# Patient Record
Sex: FEMALE | Race: WHITE | NOT HISPANIC OR LATINO | Employment: OTHER | URBAN - METROPOLITAN AREA
[De-identification: names, ages, dates, MRNs, and addresses within clinical notes are randomized per-mention and may not be internally consistent; named-entity substitution may affect disease eponyms.]

---

## 2018-09-04 ENCOUNTER — OFFICE VISIT (OUTPATIENT)
Dept: ORTHOPEDIC SURGERY | Facility: CLINIC | Age: 68
End: 2018-09-04

## 2018-09-04 ENCOUNTER — TELEPHONE (OUTPATIENT)
Dept: ORTHOPEDIC SURGERY | Facility: CLINIC | Age: 68
End: 2018-09-04

## 2018-09-04 VITALS — TEMPERATURE: 98.2 F | BODY MASS INDEX: 31.92 KG/M2 | HEIGHT: 64 IN | WEIGHT: 187 LBS

## 2018-09-04 DIAGNOSIS — M17.11 PRIMARY OSTEOARTHRITIS OF RIGHT KNEE: Primary | ICD-10-CM

## 2018-09-04 PROCEDURE — 99203 OFFICE O/P NEW LOW 30 MIN: CPT | Performed by: ORTHOPAEDIC SURGERY

## 2018-09-04 RX ORDER — LORATADINE 10 MG/1
10 TABLET ORAL DAILY
COMMUNITY

## 2018-09-04 RX ORDER — DICLOFENAC POTASSIUM 50 MG/1
50 TABLET, FILM COATED ORAL DAILY
COMMUNITY

## 2018-09-04 RX ORDER — ANASTROZOLE 1 MG/1
TABLET ORAL
COMMUNITY
Start: 2018-05-04 | End: 2018-09-04 | Stop reason: SDUPTHER

## 2018-09-04 RX ORDER — OMEPRAZOLE 20 MG/1
20 CAPSULE, DELAYED RELEASE ORAL DAILY
COMMUNITY
Start: 2018-05-30

## 2018-09-04 RX ORDER — ETODOLAC 400 MG/1
TABLET, FILM COATED ORAL
COMMUNITY
End: 2019-08-22

## 2018-09-04 RX ORDER — METOPROLOL SUCCINATE 100 MG/1
150 TABLET, EXTENDED RELEASE ORAL DAILY
COMMUNITY
Start: 2018-06-07

## 2018-09-04 RX ORDER — ATORVASTATIN CALCIUM 40 MG/1
40 TABLET, FILM COATED ORAL NIGHTLY
COMMUNITY
Start: 2018-06-07 | End: 2023-03-28

## 2018-09-04 RX ORDER — MONTELUKAST SODIUM 10 MG/1
10 TABLET ORAL NIGHTLY
COMMUNITY

## 2018-09-04 RX ORDER — ANASTROZOLE 1 MG/1
1 TABLET ORAL DAILY
COMMUNITY

## 2018-09-04 NOTE — PROGRESS NOTES
Patient: Tammy Blackburn  YOB: 1950 68 y.o. female  Medical Record Number: [id number]    Chief Complaints:   Chief Complaint   Patient presents with   • Right Knee - Pain, Establish Care       History of Present Illness:Tammy Blackburn is a 68 y.o. female who presents with 3 mo c/o right leg pain. Denies any h/o trauma. Describes the pain as moderate ache, worse with any activity. She has had pain now for about 3 months.  At times it severe is currently moderate.  She takes diclofenac and that seems to help allergies also using a brace and has had injections performed by Dr. Newton Muniz.  The injection was short lived.    Allergies: No Known Allergies    Medications:   Current Outpatient Prescriptions   Medication Sig Dispense Refill   • anastrozole (ARIMIDEX) 1 MG tablet anastrozole 1 mg tablet   Take 1 tablet every day by oral route.     • ANASTROZOLE PO Take 1 mg by mouth.     • aspirin 81 MG tablet Take 81 mg by mouth.     • atorvastatin (LIPITOR) 40 MG tablet Take 40 mg by mouth.     • diclofenac (CATAFLAM) 50 MG tablet diclofenac potassium 50 mg tablet   TK 1 T PO UP TO BID PRF PAIN     • etodolac (LODINE) 400 MG tablet etodolac 400 mg tablet   Take 1 tablet every day by oral route.     • loratadine (CLARITIN) 10 MG tablet Claritin 10 mg tablet   Take 1 tablet every day by oral route.     • metoprolol succinate XL (TOPROL-XL) 100 MG 24 hr tablet Take 150 mg by mouth.     • montelukast (SINGULAIR) 10 MG tablet montelukast 10 mg tablet   Take 1 tablet every day by oral route.     • Multiple Vitamin (MULTI-VITAMIN DAILY PO) Multi Vitamin   Take one tablet daily     • omeprazole (priLOSEC) 20 MG capsule Take 20 mg by mouth.       No current facility-administered medications for this visit.          The following portions of the patient's history were reviewed and updated as appropriate: allergies, current medications, past family history, past medical history, past social history, past  "surgical history and problem list.    Review of Systems:   A 14 point review of systems was performed. All systems negative except pertinent positives/negative listed in HPI above    Physical Exam:   Vitals:    09/04/18 1142   Temp: 98.2 °F (36.8 °C)   TempSrc: Temporal Artery    Weight: 130 kg (287 lb)   Height: 162.6 cm (64\")       General: A and O x 3, ASA, NAD    SCLERA:    Normal    DENTITION:   Normal   Knee:  right    ALIGNMENT:     Varus  ,   Patella  tracks  midline    GAIT:    Antalgic    SKIN:    No abnormality    RANGE OF MOTION:   0  -  125   DEG    STRENGTH:   4  / 5    LIGAMENTS:    No varus / valgus instability.   Negative  Lachman.    MENISCUS:     Negative   Claudia       DISTAL PULSES:    Paplable    DISTAL SENSATION :   Intact    LYMPHATICS:     No   lymphadenopathy    OTHER:          - no  effusion      - Crepitance with ROM         Radiology: patient requested not to have x-rays today.  We did review an MRI and report dated 7/25/18 which shows a radial tear of the posterior horn of the medial meniscus as well as medial compartment arthrosis and a 10 x 6 subcortical insufficiency stress fracture.    Assessment/Plan: right knee osteoarthritis with medial compartment insufficiency stress fracture likely related to the osteoarthritis.  Her symptoms are better controlled now with anti-inflammatories which I think she should continue.  We also set a goal for 10 pounds weight loss.  I'm going to have her see a physical therapist to work on some quad strengthening exercises.  She can use her brace as needed between now and then.  I'd like to check her back in 2 months and would like to perform x-rays of her knee at that point to monitor her healing in her degree of arthrosis on x-ray      Lavell Roberson MD  9/4/2018  "

## 2018-11-27 ENCOUNTER — OFFICE VISIT (OUTPATIENT)
Dept: ORTHOPEDIC SURGERY | Facility: CLINIC | Age: 68
End: 2018-11-27

## 2018-11-27 VITALS — HEIGHT: 64 IN | WEIGHT: 192 LBS | BODY MASS INDEX: 32.78 KG/M2

## 2018-11-27 DIAGNOSIS — G89.29 CHRONIC PAIN OF RIGHT KNEE: Primary | ICD-10-CM

## 2018-11-27 DIAGNOSIS — M25.561 CHRONIC PAIN OF RIGHT KNEE: Primary | ICD-10-CM

## 2018-11-27 PROCEDURE — 73562 X-RAY EXAM OF KNEE 3: CPT | Performed by: NURSE PRACTITIONER

## 2018-11-27 PROCEDURE — 99212 OFFICE O/P EST SF 10 MIN: CPT | Performed by: NURSE PRACTITIONER

## 2018-11-27 RX ORDER — GABAPENTIN 600 MG/1
600 TABLET ORAL 3 TIMES DAILY
COMMUNITY
End: 2023-03-28 | Stop reason: ALTCHOICE

## 2018-11-27 NOTE — PROGRESS NOTES
Patient: Margarette Blackburn  YOB: 1950 68 y.o. female  Medical Record Number: [id number]    Chief Complaints:   Chief Complaint   Patient presents with   • Right Knee - Pain, Follow-up       History of Present Illness:Margarette Blackburn is a 68 y.o. female who presents for follow-up of  right knee.  Patient saw Dr. Mao a couple months ago.  She does have significant osteoarthritis.  She is been taking anti-inflammatories and has continued doing physical therapy exercises which has helped considerably.  At this point she is only having some minimal pain at times.    Allergies: No Known Allergies    Medications:   Current Outpatient Medications   Medication Sig Dispense Refill   • anastrozole (ARIMIDEX) 1 MG tablet anastrozole 1 mg tablet   Take 1 tablet every day by oral route.     • ANASTROZOLE PO Take 1 mg by mouth.     • aspirin 81 MG tablet Take 81 mg by mouth.     • atorvastatin (LIPITOR) 40 MG tablet Take 40 mg by mouth.     • diclofenac (CATAFLAM) 50 MG tablet diclofenac potassium 50 mg tablet   TK 1 T PO UP TO BID PRF PAIN     • etodolac (LODINE) 400 MG tablet etodolac 400 mg tablet   Take 1 tablet every day by oral route.     • gabapentin (NEURONTIN) 600 MG tablet Take 400 mg by mouth 3 (Three) Times a Day.     • loratadine (CLARITIN) 10 MG tablet Claritin 10 mg tablet   Take 1 tablet every day by oral route.     • metoprolol succinate XL (TOPROL-XL) 100 MG 24 hr tablet Take 150 mg by mouth.     • montelukast (SINGULAIR) 10 MG tablet montelukast 10 mg tablet   Take 1 tablet every day by oral route.     • Multiple Vitamin (MULTI-VITAMIN DAILY PO) Multi Vitamin   Take one tablet daily     • omeprazole (priLOSEC) 20 MG capsule Take 20 mg by mouth.       No current facility-administered medications for this visit.          The following portions of the patient's history were reviewed and updated as appropriate: allergies, current medications, past family history, past medical history, past social  "history, past surgical history and problem list.    Review of Systems:   A 14 point review of systems was performed. All systems negative except pertinent positives/negative listed in HPI above    Physical Exam:   Vitals:    11/27/18 1109   Weight: 87.1 kg (192 lb)   Height: 162.6 cm (64.02\")       General: A and O x 3, ASA, NAD    SCLERA:    Normal    DENTITION:   Normal  Skin clear no unusual lesions noted  Right knee patient has no appreciable fusion 120° flexion neutral and extension with some mild patellofemoral crepitus noted.  Negative Lockman negative Claudia calf is soft nontender    Radiology:  Xrays 3views (ap,lateral, sunrise) right knee were ordered and reviewed today secondary to pain and show significant bone-on-bone end-stage osteoarthritis of bilateral knees.  Comparative views are unchanged     Assessment/Plan:  Osteoarthritis right knee    I did offer patient injections.  She declines at this time.  She will continue with exercise program, continued weight loss, and she will return the office when necessary  "

## 2019-08-21 PROBLEM — E83.42 HYPOMAGNESEMIA: Status: ACTIVE | Noted: 2018-12-14

## 2019-08-21 PROBLEM — I10 BENIGN ESSENTIAL HYPERTENSION: Status: ACTIVE | Noted: 2019-08-21

## 2019-08-21 PROBLEM — Z92.89 H/O ECHOCARDIOGRAM: Status: ACTIVE | Noted: 2019-08-21

## 2019-08-21 PROBLEM — I31.39 PERICARDIAL EFFUSION: Status: ACTIVE | Noted: 2018-12-14

## 2019-08-22 ENCOUNTER — OFFICE VISIT (OUTPATIENT)
Dept: CARDIOLOGY | Facility: CLINIC | Age: 69
End: 2019-08-22

## 2019-08-22 VITALS
HEART RATE: 71 BPM | DIASTOLIC BLOOD PRESSURE: 80 MMHG | OXYGEN SATURATION: 95 % | BODY MASS INDEX: 32.96 KG/M2 | HEIGHT: 63 IN | WEIGHT: 186 LBS | SYSTOLIC BLOOD PRESSURE: 124 MMHG

## 2019-08-22 DIAGNOSIS — E78.5 HYPERLIPIDEMIA LDL GOAL <130: ICD-10-CM

## 2019-08-22 DIAGNOSIS — I10 BENIGN ESSENTIAL HYPERTENSION: Primary | ICD-10-CM

## 2019-08-22 DIAGNOSIS — C50.919: ICD-10-CM

## 2019-08-22 DIAGNOSIS — I31.39 PERICARDIAL EFFUSION: ICD-10-CM

## 2019-08-22 PROCEDURE — 99213 OFFICE O/P EST LOW 20 MIN: CPT | Performed by: INTERNAL MEDICINE
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
## 2020-02-27 ENCOUNTER — OFFICE VISIT (OUTPATIENT)
Dept: CARDIOLOGY | Facility: CLINIC | Age: 70
End: 2020-02-27

## 2020-02-27 VITALS
HEART RATE: 84 BPM | BODY MASS INDEX: 33.13 KG/M2 | OXYGEN SATURATION: 97 % | DIASTOLIC BLOOD PRESSURE: 78 MMHG | HEIGHT: 63 IN | SYSTOLIC BLOOD PRESSURE: 148 MMHG | WEIGHT: 187 LBS

## 2020-02-27 DIAGNOSIS — E78.5 HYPERLIPIDEMIA LDL GOAL <130: ICD-10-CM

## 2020-02-27 DIAGNOSIS — I10 BENIGN ESSENTIAL HYPERTENSION: Primary | ICD-10-CM

## 2020-02-27 DIAGNOSIS — C50.919: ICD-10-CM

## 2020-02-27 PROCEDURE — 99214 OFFICE O/P EST MOD 30 MIN: CPT | Performed by: INTERNAL MEDICINE

## 2020-02-27 RX ORDER — ICOSAPENT ETHYL 1000 MG/1
2 CAPSULE ORAL 2 TIMES DAILY WITH MEALS
Qty: 120 CAPSULE | Refills: 5 | Status: SHIPPED | OUTPATIENT
Start: 2020-02-27 | End: 2020-09-03 | Stop reason: ALTCHOICE

## 2020-02-27 RX ORDER — DOXYCYCLINE HYCLATE 100 MG
TABLET ORAL
COMMUNITY
Start: 2020-02-25 | End: 2020-09-03 | Stop reason: ALTCHOICE

## 2020-02-27 RX ORDER — LISINOPRIL 20 MG/1
20 TABLET ORAL DAILY
COMMUNITY
Start: 2020-02-13 | End: 2020-03-16

## 2020-02-28 NOTE — PROGRESS NOTES
[facility]        Subjective:     Encounter Date:02/27/2020      Patient ID: Margarette Blackburn is a 69 y.o. female.      HPI: I saw Margarette blackburn today for cardiovascular care.  She is a very pleasant 69-year-old female with a history of breast carcinoma status post right mastectomy.  She has a history of hypertension and dyslipidemia.  She presents today reporting recent upper respiratory infection with mild dyspnea on exertion.  She remains free of chest pain pressure or tightness.  She does not report orthopnea or PND no syncope near syncope or palpitations.  She continues to tolerate her current medical regimen well which includes atorvastatin 40 mg daily.  Her lipids are monitored by Dr. Layla Sr, her primary care physician.      The following portions of the patient's history were reviewed and updated as appropriate: allergies, current medications, past family history, past medical history, past social history, past surgical history and problem list.    Problem List:  Patient Active Problem List   Diagnosis   • Inflammatory carcinoma of breast, stage 3 (CMS/HCC)   • Hyperlipidemia LDL goal <130   • Hypomagnesemia   • GERD (gastroesophageal reflux disease)   • Pericardial effusion   • Benign essential hypertension   • H/O echocardiogram       Past Medical History:  Past Medical History:   Diagnosis Date   • Benign essential hypertension 8/21/2019   • GERD (gastroesophageal reflux disease) 10/30/2014   • H/O echocardiogram 8/21/2019    3/7/2018 - Normal LV size and wall thickness.  EF 60%.  Trace MR and TR.   • Hyperlipidemia LDL goal <130 10/18/2012   • Hypomagnesemia 12/14/2018   • Inflammatory carcinoma of breast, stage 3 (CMS/HCC) 12/1/2016   • Pericardial effusion 12/14/2018       Past Surgical History:  Past Surgical History:   Procedure Laterality Date   • GALLBLADDER SURGERY     • HYSTERECTOMY     • KNEE SURGERY         Social History:  Social History     Socioeconomic History   •  "Marital status:      Spouse name: Not on file   • Number of children: Not on file   • Years of education: Not on file   • Highest education level: Not on file   Tobacco Use   • Smoking status: Never Smoker   Substance and Sexual Activity   • Alcohol use: No   • Drug use: No   • Sexual activity: Defer       Allergies:  No Known Allergies      ROS:  Review of Systems   Constitution: Negative for malaise/fatigue.   HENT: Negative for hearing loss and nosebleeds.    Eyes: Negative for double vision and visual disturbance.   Cardiovascular: Positive for dyspnea on exertion. Negative for chest pain, claudication, near-syncope, orthopnea, palpitations, paroxysmal nocturnal dyspnea and syncope.   Respiratory: Negative for cough, shortness of breath, sleep disturbances due to breathing, snoring, sputum production and wheezing.    Endocrine: Negative for cold intolerance, heat intolerance, polydipsia and polyuria.   Hematologic/Lymphatic: Negative for adenopathy and bleeding problem. Does not bruise/bleed easily.   Skin: Negative for flushing and itching.   Musculoskeletal: Negative for back pain, falls, joint pain, joint swelling, muscle weakness and neck pain.   Gastrointestinal: Negative for abdominal pain, dysphagia, heartburn, nausea and vomiting.   Genitourinary: Negative for dysuria and frequency.   Neurological: Negative for disturbances in coordination, dizziness, light-headedness, loss of balance, numbness and weakness.   Psychiatric/Behavioral: Negative for altered mental status and memory loss. The patient is not nervous/anxious.    Allergic/Immunologic: Negative for hives and persistent infections.          Objective:         /78 (BP Location: Left arm, Patient Position: Sitting, Cuff Size: Large Adult)   Pulse 84   Ht 160 cm (63\")   Wt 84.8 kg (187 lb)   SpO2 97%   BMI 33.13 kg/m²     Physical Exam   Constitutional: She is oriented to person, place, and time. She appears well-developed and " well-nourished.   HENT:   Head: Normocephalic and atraumatic.   Eyes: Pupils are equal, round, and reactive to light. Conjunctivae and EOM are normal.   Neck: Neck supple. No thyromegaly present.   Cardiovascular: Normal rate, regular rhythm, S1 normal, S2 normal and intact distal pulses. PMI is not displaced. Exam reveals gallop and S4. Exam reveals no S3, no distant heart sounds, no friction rub, no midsystolic click and no opening snap.   No murmur heard.  Pulses:       Carotid pulses are 2+ on the right side, and 2+ on the left side.       Radial pulses are 2+ on the right side, and 2+ on the left side.        Femoral pulses are 2+ on the right side, and 2+ on the left side.       Dorsalis pedis pulses are 2+ on the right side, and 2+ on the left side.        Posterior tibial pulses are 2+ on the right side, and 2+ on the left side.   Pulmonary/Chest: Effort normal and breath sounds normal. No respiratory distress. She has no wheezes. She has no rales.   Abdominal: Soft. Bowel sounds are normal. She exhibits no distension and no mass. There is no tenderness.   Musculoskeletal: Normal range of motion. She exhibits no edema.   Neurological: She is alert and oriented to person, place, and time.   Skin: Skin is warm and dry. No erythema.   Psychiatric: She has a normal mood and affect.       In-Office Procedure(s):  Procedures    ASCVD RIsk Score::  The 10-year ASCVD risk score ([place] ANGELO Jr., et al., 2013) is: 15.2%    Values used to calculate the score:      Age: 69 years      Sex: Female      Is Non- : No      Diabetic: No      Tobacco smoker: No      Systolic Blood Pressure: 148 mmHg      Is BP treated: Yes      HDL Cholesterol: 35 mg/dL      Total Cholesterol: 202 mg/dL        Assessment/Plan:         1. Benign essential hypertension  Target less than 130/80    2. Hyperlipidemia LDL goal <100  Triglycerides less than 150    3. Inflammatory carcinoma of breast, stage 3, unspecified  laterality (CMS/HCC)  Stable    Ms. Blackburn is free of angina appears euvolemic.  I recommend target blood pressure less than 130/80.  I have encouraged her to restrict the salt in her diet to as close to 2000 mg a day as possible.  Her last lipid profile from 12/23/2019 revealed triglycerides 345, LDL 98, HDL 35.  I encouraged her to observe a low-cholesterol low saturated fat diet and I recommended she begin Vascepa 2 g twice daily.  I have encouraged her to increase her activity level and I will see her in follow-up in 6 months.       Jeet Spear MD  02/27/20  .

## 2020-03-16 RX ORDER — LISINOPRIL 20 MG/1
TABLET ORAL
Qty: 90 TABLET | Refills: 1 | Status: SHIPPED | OUTPATIENT
Start: 2020-03-16 | End: 2020-09-03 | Stop reason: ALTCHOICE

## 2020-09-03 ENCOUNTER — OFFICE VISIT (OUTPATIENT)
Dept: CARDIOLOGY | Facility: CLINIC | Age: 70
End: 2020-09-03

## 2020-09-03 VITALS
WEIGHT: 185 LBS | BODY MASS INDEX: 32.78 KG/M2 | DIASTOLIC BLOOD PRESSURE: 80 MMHG | SYSTOLIC BLOOD PRESSURE: 144 MMHG | HEIGHT: 63 IN | HEART RATE: 76 BPM

## 2020-09-03 DIAGNOSIS — E78.5 HYPERLIPIDEMIA LDL GOAL <130: ICD-10-CM

## 2020-09-03 DIAGNOSIS — R53.82 CHRONIC FATIGUE: ICD-10-CM

## 2020-09-03 DIAGNOSIS — I10 BENIGN ESSENTIAL HYPERTENSION: Primary | ICD-10-CM

## 2020-09-03 DIAGNOSIS — C50.911: ICD-10-CM

## 2020-09-03 PROCEDURE — 99214 OFFICE O/P EST MOD 30 MIN: CPT | Performed by: INTERNAL MEDICINE

## 2020-09-03 RX ORDER — CHLORAL HYDRATE 500 MG
1 CAPSULE ORAL DAILY
COMMUNITY

## 2020-09-03 RX ORDER — LISINOPRIL 40 MG/1
40 TABLET ORAL DAILY
COMMUNITY
Start: 2020-06-22

## 2020-09-03 RX ORDER — AMLODIPINE BESYLATE 2.5 MG/1
2.5 TABLET ORAL DAILY
Qty: 30 TABLET | Refills: 5 | Status: SHIPPED | OUTPATIENT
Start: 2020-09-03 | End: 2020-12-02

## 2020-09-03 NOTE — PROGRESS NOTES
[facility]        Subjective:     Encounter Date:09/03/2020      Patient ID: Margarette Blackburn is a 70 y.o. female.      HPI: I saw Margarette Blackburn today for cardiovascular care.  She is a pleasant 70-year-old female who observes the CDC guidelines for social distancing.  She remains free of fever or cough.  She does report her baseline dyspnea on exertion but this is stable.  She is free of change in her sense of smell or taste.  Margarette denies chest pain pressure or tightness.  She is free of orthopnea or PND and no lower extremity edema.  She denies syncope near syncope or palpitation she does report generalized fatigue.  She feels it may be related to her medications.  She has a history of breast carcinoma status post right mastectomy.  She has known hypertension her blood pressure is elevated at 144/80.  She has known dyslipidemia and is on atorvastatin 40 mg daily and fish oil 1200 mg daily.      The following portions of the patient's history were reviewed and updated as appropriate: allergies, current medications, past family history, past medical history, past social history, past surgical history and problem list.    Problem List:  Patient Active Problem List   Diagnosis   • Inflammatory carcinoma of breast, stage 3 (CMS/HCC)   • Hyperlipidemia LDL goal <130   • Hypomagnesemia   • GERD (gastroesophageal reflux disease)   • Pericardial effusion   • Benign essential hypertension   • H/O echocardiogram   • Chronic fatigue       Past Medical History:  Past Medical History:   Diagnosis Date   • Benign essential hypertension 8/21/2019   • GERD (gastroesophageal reflux disease) 10/30/2014   • H/O echocardiogram 8/21/2019    3/7/2018 - Normal LV size and wall thickness.  EF 60%.  Trace MR and TR.   • Hyperlipidemia LDL goal <130 10/18/2012   • Hypomagnesemia 12/14/2018   • Inflammatory carcinoma of breast, stage 3 (CMS/HCC) 12/1/2016   • Pericardial effusion 12/14/2018       Past Surgical  "History:  Past Surgical History:   Procedure Laterality Date   • GALLBLADDER SURGERY     • HYSTERECTOMY     • KNEE SURGERY         Social History:  Social History     Socioeconomic History   • Marital status:      Spouse name: Not on file   • Number of children: Not on file   • Years of education: Not on file   • Highest education level: Not on file   Tobacco Use   • Smoking status: Never Smoker   Substance and Sexual Activity   • Alcohol use: No   • Drug use: No   • Sexual activity: Defer       Allergies:  No Known Allergies      ROS:  Review of Systems   Constitution: Positive for malaise/fatigue.   HENT: Negative for hearing loss and nosebleeds.    Eyes: Negative for double vision and visual disturbance.   Cardiovascular: Positive for dyspnea on exertion. Negative for chest pain, claudication, near-syncope, orthopnea, palpitations, paroxysmal nocturnal dyspnea and syncope.   Respiratory: Negative for cough, shortness of breath, sleep disturbances due to breathing, snoring, sputum production and wheezing.    Endocrine: Negative for cold intolerance, heat intolerance, polydipsia and polyuria.   Hematologic/Lymphatic: Negative for adenopathy and bleeding problem. Does not bruise/bleed easily.   Skin: Negative for flushing and itching.   Musculoskeletal: Negative for back pain, falls, joint pain, joint swelling, muscle weakness and neck pain.   Gastrointestinal: Negative for abdominal pain, dysphagia, heartburn, nausea and vomiting.   Genitourinary: Negative for dysuria and frequency.   Neurological: Negative for disturbances in coordination, dizziness, light-headedness, loss of balance, numbness and weakness.   Psychiatric/Behavioral: Negative for altered mental status and memory loss. The patient is not nervous/anxious.    Allergic/Immunologic: Negative for hives and persistent infections.          Objective:         /80   Pulse 76   Ht 160 cm (63\")   Wt 83.9 kg (185 lb)   BMI 32.77 kg/m² "     Physical Exam   Constitutional: She is oriented to person, place, and time. She appears well-developed and well-nourished.   HENT:   Head: Normocephalic and atraumatic.   Eyes: Pupils are equal, round, and reactive to light. Conjunctivae and EOM are normal.   Neck: Neck supple. No thyromegaly present.   Cardiovascular: Normal rate, regular rhythm, S1 normal, S2 normal and intact distal pulses. PMI is not displaced. Exam reveals gallop and S4. Exam reveals no S3, no distant heart sounds, no friction rub, no midsystolic click and no opening snap.   No murmur heard.  Pulses:       Carotid pulses are 2+ on the right side, and 2+ on the left side.       Radial pulses are 2+ on the right side, and 2+ on the left side.        Femoral pulses are 2+ on the right side, and 2+ on the left side.       Dorsalis pedis pulses are 2+ on the right side, and 2+ on the left side.        Posterior tibial pulses are 2+ on the right side, and 2+ on the left side.   Pulmonary/Chest: Effort normal and breath sounds normal. No respiratory distress. She has no wheezes. She has no rales.   Abdominal: Soft. Bowel sounds are normal. She exhibits no distension and no mass. There is no tenderness.   Musculoskeletal: Normal range of motion. She exhibits no edema.   Neurological: She is alert and oriented to person, place, and time.   Skin: Skin is warm and dry. No erythema.   Psychiatric: She has a normal mood and affect.       In-Office Procedure(s):  Procedures    ASCVD RIsk Score::  The 10-year ASCVD risk score (Johnathon PETEY Jr., et al., 2013) is: 15.5%    Values used to calculate the score:      Age: 70 years      Sex: Female      Is Non- : No      Diabetic: No      Tobacco smoker: No      Systolic Blood Pressure: 144 mmHg      Is BP treated: Yes      HDL Cholesterol: 40 mg/dL      Total Cholesterol: 212 mg/dL        Assessment/Plan:         1. Benign essential hypertension  Target less than 130/80    2. Hyperlipidemia  LDL goal <130  Continue low-cholesterol low saturated fat diet and atorvastatin 40 mg daily fish oil 1200 mg daily    3. Inflammatory carcinoma of right breast, stage 3 (CMS/HCC)      4. Chronic fatigue  Suspect multifactorial including age weight deconditioning but also possibly beta-blocker therapy    5.  Obesity  Encourage weight reduction    Ms. Blackburn is free of angina and euvolemic.  I have counseled her on the importance of salt restriction is close to 2000 mg a day as possible.  Of encouraged her to advance her activity level specifically aerobic activity while observing the CDC guidelines for social distancing.  Of encouraged her to continue a low-cholesterol low saturated fat weight reduction diet.  We will initiate amlodipine 2.5 mg daily.  I have recommended a target blood pressure of less than 130/80.  We can gradually advance amlodipine to maintain blood pressure control and perhaps reduce her beta-blocker therapy.  I will otherwise see her in follow-up 6 months sooner if needed.       Jeet Spear MD  09/03/20  .

## 2020-10-02 ENCOUNTER — TELEPHONE (OUTPATIENT)
Dept: CARDIOLOGY | Facility: CLINIC | Age: 70
End: 2020-10-02

## 2020-10-02 NOTE — TELEPHONE ENCOUNTER
Nicole please tell patient I think that is a good blood pressure.  I would continue on her current medicines.

## 2020-10-02 NOTE — TELEPHONE ENCOUNTER
CNA pt,  Patient was started on Amlodipine 2.5 mg, states she feels tired and her BP has dropped to 115/56 HR 71. Would like to know what she should do?

## 2020-10-08 ENCOUNTER — TELEPHONE (OUTPATIENT)
Dept: CARDIOLOGY | Facility: CLINIC | Age: 70
End: 2020-10-08

## 2020-10-08 NOTE — TELEPHONE ENCOUNTER
Lvm for the patient to d/c Amlodipine, check her bp on Monday & if her bp is over 130/80 we will initiate the hydralazine

## 2020-10-08 NOTE — TELEPHONE ENCOUNTER
Sindy, tell Ms. cazares to discontinue Norvasc see if the swelling and hurting in her feet resolves check her blood pressure and if it is above 130/80 we can initiate hydralazine 25 mg twice daily.

## 2020-10-08 NOTE — TELEPHONE ENCOUNTER
Pt called to report that her feet are swelling and hurting since starting norvasc 2.5 mg  Please advise  cb#[phone]

## 2020-12-02 RX ORDER — AMLODIPINE BESYLATE 2.5 MG/1
2.5 TABLET ORAL DAILY
Qty: 90 TABLET | Refills: 1 | Status: SHIPPED | OUTPATIENT
Start: 2020-12-02 | End: 2021-05-13 | Stop reason: SINTOL

## 2021-03-03 PROBLEM — I10 BENIGN ESSENTIAL HYPERTENSION: Chronic | Status: ACTIVE | Noted: 2019-08-21

## 2021-03-03 PROBLEM — I51.89 GRADE I DIASTOLIC DYSFUNCTION: Chronic | Status: ACTIVE | Noted: 2021-03-03

## 2021-03-03 PROBLEM — R53.82 CHRONIC FATIGUE: Chronic | Status: ACTIVE | Noted: 2020-09-03

## 2021-03-22 ENCOUNTER — BULK ORDERING (OUTPATIENT)
Dept: CASE MANAGEMENT | Facility: OTHER | Age: 71
End: 2021-03-22

## 2021-03-22 DIAGNOSIS — Z23 IMMUNIZATION DUE: ICD-10-CM

## 2021-05-13 ENCOUNTER — OFFICE VISIT (OUTPATIENT)
Dept: CARDIOLOGY | Facility: CLINIC | Age: 71
End: 2021-05-13

## 2021-05-13 VITALS
HEART RATE: 71 BPM | HEIGHT: 63 IN | DIASTOLIC BLOOD PRESSURE: 84 MMHG | SYSTOLIC BLOOD PRESSURE: 128 MMHG | BODY MASS INDEX: 32.96 KG/M2 | WEIGHT: 186 LBS

## 2021-05-13 DIAGNOSIS — I10 BENIGN ESSENTIAL HYPERTENSION: Primary | ICD-10-CM

## 2021-05-13 DIAGNOSIS — I51.89 GRADE I DIASTOLIC DYSFUNCTION: ICD-10-CM

## 2021-05-13 DIAGNOSIS — E78.5 HYPERLIPIDEMIA LDL GOAL <130: ICD-10-CM

## 2021-05-13 DIAGNOSIS — C50.911: ICD-10-CM

## 2021-05-13 DIAGNOSIS — I31.39 PERICARDIAL EFFUSION: ICD-10-CM

## 2021-05-13 PROCEDURE — 99214 OFFICE O/P EST MOD 30 MIN: CPT | Performed by: INTERNAL MEDICINE

## 2021-05-13 NOTE — PROGRESS NOTES
"Chief Complaint  Hypertension    Subjective    History of Present Illness      I saw Margarette cazares today for continued cardiovascular care.  She is a pleasant 70-year-old female who observes the CDC guidelines during the coronavirus pandemic.  Margarette is in good spirits and states that she is free of chest pain pressure tightness.  She is not experiencing any significant or progressive dyspnea on exertion.  She denies orthopnea or PND no syncope near syncope or palpitations.  She does report bilateral knee discomfort and received an injection in the right knee this morning.  Her blood pressure remains well controlled.  She has a history of hyperlipidemia and remains on atorvastatin 40 mg daily.  Her lipids are monitored by primary care physician Dr. Layla Sr.    Objective   Vital Signs:   /84   Pulse 71   Ht 160 cm (63\")   Wt 84.4 kg (186 lb)   BMI 32.95 kg/m²     Constitutional:       Appearance: Well-developed.   Eyes:      Conjunctiva/sclera: Conjunctivae normal.      Pupils: Pupils are equal, round, and reactive to light.   HENT:      Head: Normocephalic and atraumatic.   Neck:      Thyroid: No thyromegaly.   Pulmonary:      Effort: Pulmonary effort is normal. No respiratory distress.      Breath sounds: Normal breath sounds. No wheezing. No rales.   Cardiovascular:      Normal rate. Regular rhythm.      S4 Gallop. No S3 gallop. Midsystolic click click.   Edema:     Peripheral edema absent.   Abdominal:      General: Bowel sounds are normal. There is no distension.      Palpations: Abdomen is soft. There is no abdominal mass.      Tenderness: There is no abdominal tenderness.   Musculoskeletal: Normal range of motion.      Cervical back: Neck supple. Skin:     General: Skin is warm and dry.      Findings: No erythema.   Neurological:      Mental Status: Alert and oriented to person, place, and time.         Result Review :     Common labs    Common Labsle 6/22/20 6/22/20 6/22/20 12/30/20    1113 1113 " 1113    Glucose 124 (A)   119 (A)   BUN 21 (A)   22 (A)   Creatinine 0.5 (A)   0.6 (A)   Sodium 138   141   Potassium 4.0   4.7   Chloride 101   105   Calcium 9.3   9.9   Albumin 4.4   3.9   Total Bilirubin 0.5   0.4   Alkaline Phosphatase 103   113   AST (SGOT) 21   27   ALT (SGPT) 34   33   WBC   4.41 (A)    Hemoglobin   13.4    Hematocrit   40.9    Platelets   197    Total Cholesterol  212 (A)     Triglycerides  339 (A)     HDL Cholesterol  40 (A)     LDL Cholesterol   104 (A)     (A) Abnormal value            Data reviewed: Cardiology studies Review of echocardiogram from 2/14/2019 reveals preserved left ventricular function mildly dilated left atrium, mild concentric left ventricular hypertrophy, grade 1 diastolic dysfunction, no significant valvular abnormality.          Assessment and Plan    1. Benign essential hypertension  Controlled    2. Grade I diastolic dysfunction  Stable    3. Hyperlipidemia LDL goal <130  Stable, ideal target would be triglycerides less than 150 LDL less than 100    4. Pericardial effusion  Stable    5. Inflammatory carcinoma of right breast, stage 3 (CMS/HCC)      Ms. Blackburn is free of angina and euvolemic on physical examination.  Her blood pressure remains controlled.  I have encouraged her to observe a low-cholesterol low saturated fat diet with salt restriction is close to 2000 mg a day as possible she will continue her current medical regimen and I will see her in follow-up in 6 months.        Follow Up   No follow-ups on file.  Patient was given instructions and counseling regarding her condition or for health maintenance advice. Please see specific information pulled into the AVS if appropriate.

## 2021-06-01 RX ORDER — AMLODIPINE BESYLATE 2.5 MG/1
2.5 TABLET ORAL DAILY
Qty: 90 TABLET | Refills: 1 | Status: SHIPPED | OUTPATIENT
Start: 2021-06-01 | End: 2021-11-29

## 2021-11-29 RX ORDER — AMLODIPINE BESYLATE 2.5 MG/1
2.5 TABLET ORAL DAILY
Qty: 30 TABLET | Refills: 0 | Status: SHIPPED | OUTPATIENT
Start: 2021-11-29 | End: 2021-12-16 | Stop reason: SINTOL

## 2021-12-16 ENCOUNTER — OFFICE VISIT (OUTPATIENT)
Dept: CARDIOLOGY | Facility: CLINIC | Age: 71
End: 2021-12-16

## 2021-12-16 VITALS
DIASTOLIC BLOOD PRESSURE: 80 MMHG | HEART RATE: 77 BPM | WEIGHT: 187 LBS | BODY MASS INDEX: 33.13 KG/M2 | HEIGHT: 63 IN | SYSTOLIC BLOOD PRESSURE: 160 MMHG

## 2021-12-16 DIAGNOSIS — I10 BENIGN ESSENTIAL HYPERTENSION: Primary | ICD-10-CM

## 2021-12-16 DIAGNOSIS — E78.5 HYPERLIPIDEMIA LDL GOAL <130: ICD-10-CM

## 2021-12-16 DIAGNOSIS — I51.89 GRADE I DIASTOLIC DYSFUNCTION: ICD-10-CM

## 2021-12-16 DIAGNOSIS — C50.911: ICD-10-CM

## 2021-12-16 PROCEDURE — 99214 OFFICE O/P EST MOD 30 MIN: CPT | Performed by: INTERNAL MEDICINE

## 2021-12-16 NOTE — PROGRESS NOTES
"Chief Complaint  Hypertension    Subjective    History of Present Illness      I saw Margarette Blackburn today for continued cardiovascular care.  She is a very pleasant 71-year-old female with a history of breast carcinoma.  She remains free of chest pain pressure or tightness.  She does not report any significant or progressive dyspnea on exertion.  She denies orthopnea or PND and does not experience lower extremity edema  Margarette is free of syncope near syncope or palpitations.  Her blood pressure is elevated in the office today at 160/80.  He states that she monitors her blood pressure and it primarily remains under 130/80.  Review of echocardiogram dated2/14/2019 reveals preserved left ventricular contractility, mild concentric left ventricular hypertrophy.  Repeat blood pressure today 152/78.    Objective   Vital Signs:   /80   Pulse 77   Ht 160 cm (63\")   Wt 84.8 kg (187 lb)   BMI 33.13 kg/m²     Constitutional:       Appearance: Well-developed. Obese.   Eyes:      Conjunctiva/sclera: Conjunctivae normal.      Pupils: Pupils are equal, round, and reactive to light.   HENT:      Head: Normocephalic and atraumatic.   Neck:      Thyroid: No thyromegaly.   Pulmonary:      Effort: Pulmonary effort is normal. No respiratory distress.      Breath sounds: Normal breath sounds. No wheezing. No rales.   Cardiovascular:      Normal rate. Regular rhythm.      S4 Gallop. No S3 gallop. No rub.   Edema:     Peripheral edema absent.   Abdominal:      General: Bowel sounds are normal. There is no distension.      Palpations: Abdomen is soft. There is no abdominal mass.      Tenderness: There is no abdominal tenderness.   Musculoskeletal: Normal range of motion.      Cervical back: Neck supple. Skin:     General: Skin is warm and dry.      Findings: No erythema.   Neurological:      Mental Status: Alert and oriented to person, place, and time.         Result Review :     Common labs    Common Labsle 12/30/20 6/18/21 "   Glucose 119 (A) 123 (A)   BUN 22 (A) 18   Creatinine 0.6 (A) 0.6 (A)   Sodium 141 139   Potassium 4.7 4.6   Chloride 105 102   Calcium 9.9 9.5   Albumin 3.9 4.3   Total Bilirubin 0.4 0.6   Alkaline Phosphatase 113 119   AST (SGOT) 27 20   ALT (SGPT) 33 28   (A) Abnormal value          Fasting lipids from 6/18/2021 triglycerides 295,  HDL 40            Assessment and Plan {CC Problem List  Visit Diagnosis  ROS  Review (Popup)  Health Maintenance  Quality  BestPractice  Medications  SmartSets  SnapShot Encounters  Media :23}   1. Benign essential hypertension  Target consistently less than 130/80    2. Grade I diastolic dysfunction  Compensated    3. Hyperlipidemia LDL goal <100  Low-cholesterol low saturated fat weight reduction diet and continue atorvastatin 40 mg daily    4. Inflammatory carcinoma of right breast, stage 3 (HCC)    5.  Obesity  Weight reduction    Margarette is free of angina and euvolemic on physical examination.  I have counseled her on the importance of salt restriction is close to 2000 mg a day as possible.  I have encouraged her to observe a low-cholesterol low saturated fat diet and pursue weight reduction.  I recommended she advance her aerobic activity.  I will obtain an echocardiogram to further evaluate left ventricular size wall thickness systolic and diastolic function as well as valvular function.  I recommended blood pressure consistently under 130/80, LDL under 100 and triglycerides under 150.  I will tentatively plan to see him in follow-up in 4 months.        Follow Up   No follow-ups on file.  Patient was given instructions and counseling regarding her condition or for health maintenance advice. Please see specific information pulled into the AVS if appropriate.

## 2021-12-27 RX ORDER — AMLODIPINE BESYLATE 2.5 MG/1
2.5 TABLET ORAL DAILY
Qty: 90 TABLET | OUTPATIENT
Start: 2021-12-27

## 2022-02-03 ENCOUNTER — APPOINTMENT (OUTPATIENT)
Dept: CARDIOLOGY | Facility: HOSPITAL | Age: 72
End: 2022-02-03

## 2022-03-18 ENCOUNTER — APPOINTMENT (OUTPATIENT)
Dept: CARDIOLOGY | Facility: HOSPITAL | Age: 72
End: 2022-03-18

## 2022-03-21 ENCOUNTER — TELEPHONE (OUTPATIENT)
Dept: CARDIOLOGY | Facility: CLINIC | Age: 72
End: 2022-03-21

## 2022-03-21 NOTE — TELEPHONE ENCOUNTER
Yes, that will be fine.  I just will not be able to see the images, I will just have a typed report.

## 2022-03-21 NOTE — TELEPHONE ENCOUNTER
PT is having trouble getting out to [place] to have her Echo, She is asking can she have it done at [facility]?

## 2022-04-27 NOTE — PROGRESS NOTES
"Chief Complaint  Hypertension    Subjective    History of Present Illness      I saw Margarette Blackburn today for cardiovascular care.  She is a pleasant 71-year-old female who denies chest pain pressure tightness.  Her respiratory status is stable.  She is free of orthopnea, PND or lower extremity edema.  She denies syncope near syncope or palpitation.  Her blood pressure is well controlled.  She has a history of hyperlipidemia and is on atorvastatin 40 mg daily and omega-3 fatty acids 1000 mg daily her BMI is 32.42 she has had a 4 pound weight reduction.  Echocardiogram obtained and [facility] 1122 revealed preserved left ventricular contractility, grade 1 diastolic dysfunction no significant valvular abnormality, right ventricular systolic pressure was 19 mmHg.      Objective   Vital Signs:   /82   Pulse 71   Ht 160 cm (63\")   Wt 83 kg (183 lb)   BMI 32.42 kg/m²     Constitutional:       Appearance: Well-developed.   Eyes:      Conjunctiva/sclera: Conjunctivae normal.      Pupils: Pupils are equal, round, and reactive to light.   HENT:      Head: Normocephalic and atraumatic.   Neck:      Thyroid: No thyromegaly.   Pulmonary:      Effort: Pulmonary effort is normal. No respiratory distress.      Breath sounds: Normal breath sounds. No wheezing. No rales.   Cardiovascular:      Normal rate. Regular rhythm.      S4 Gallop. No S3 gallop. No rub.   Edema:     Peripheral edema absent.   Abdominal:      General: Bowel sounds are normal. There is no distension.      Palpations: Abdomen is soft. There is no abdominal mass.      Tenderness: There is no abdominal tenderness.   Musculoskeletal: Normal range of motion.      Cervical back: Neck supple. Skin:     General: Skin is warm and dry.      Findings: No erythema.   Neurological:      Mental Status: Alert and oriented to person, place, and time.         Result Review :     Common labs    Common Labsle 6/18/21 12/28/21   Glucose 123 (A) 98   BUN 18 " 19   Creatinine 0.6 (A) 0.60   Sodium 139 140   Potassium 4.6 4.5   Chloride 102 102   Calcium 9.5 8.8   Albumin 4.3 4.2   Total Bilirubin 0.6 0.4   Alkaline Phosphatase 119 110   AST (SGOT) 20 27   ALT (SGPT) 28 35   (A) Abnormal value                      Assessment and Plan    1. Benign essential hypertension  Controlled    2. Grade I diastolic dysfunction  Compensated    3. Hyperlipidemia LDL goal <100  Continue low-cholesterol low saturated fat diet statin and fish oil    4. Pericardial effusion  Review echocardiogram obtained 4/11/2022 at [facility], report currently not available to me    5. Chronic fatigue  Stable    Margarette reports feeling well and remaining active free of chest discomfort and euvolemic on examination.  I made no changes in her medications.  Ideally I like to see her LDL under 100 and triglycerides under 150.   I will see her in follow-up in 6 months.        Follow Up   No follow-ups on file.  Patient was given instructions and counseling regarding her condition or for health maintenance advice. Please see specific information pulled into the AVS if appropriate.

## 2022-04-28 ENCOUNTER — OFFICE VISIT (OUTPATIENT)
Dept: CARDIOLOGY | Facility: CLINIC | Age: 72
End: 2022-04-28

## 2022-04-28 VITALS
BODY MASS INDEX: 32.43 KG/M2 | HEART RATE: 71 BPM | SYSTOLIC BLOOD PRESSURE: 126 MMHG | HEIGHT: 63 IN | WEIGHT: 183 LBS | DIASTOLIC BLOOD PRESSURE: 82 MMHG

## 2022-04-28 DIAGNOSIS — R53.82 CHRONIC FATIGUE: ICD-10-CM

## 2022-04-28 DIAGNOSIS — I51.89 GRADE I DIASTOLIC DYSFUNCTION: ICD-10-CM

## 2022-04-28 DIAGNOSIS — I31.39 PERICARDIAL EFFUSION: ICD-10-CM

## 2022-04-28 DIAGNOSIS — E78.5 HYPERLIPIDEMIA LDL GOAL <130: ICD-10-CM

## 2022-04-28 DIAGNOSIS — I10 BENIGN ESSENTIAL HYPERTENSION: Primary | ICD-10-CM

## 2022-04-28 PROCEDURE — 99214 OFFICE O/P EST MOD 30 MIN: CPT | Performed by: INTERNAL MEDICINE

## 2022-04-28 RX ORDER — ZINC GLUCONATE 50 MG
1 TABLET ORAL DAILY
COMMUNITY

## 2022-05-03 ENCOUNTER — TELEPHONE (OUTPATIENT)
Dept: CARDIOLOGY | Facility: CLINIC | Age: 72
End: 2022-05-03

## 2023-03-28 ENCOUNTER — OFFICE VISIT (OUTPATIENT)
Dept: CARDIOLOGY | Facility: CLINIC | Age: 73
End: 2023-03-28
Payer: MEDICARE

## 2023-03-28 VITALS
WEIGHT: 186 LBS | SYSTOLIC BLOOD PRESSURE: 142 MMHG | HEIGHT: 63 IN | BODY MASS INDEX: 32.96 KG/M2 | HEART RATE: 74 BPM | DIASTOLIC BLOOD PRESSURE: 88 MMHG

## 2023-03-28 DIAGNOSIS — I51.89 GRADE I DIASTOLIC DYSFUNCTION: Chronic | ICD-10-CM

## 2023-03-28 DIAGNOSIS — C50.911: Chronic | ICD-10-CM

## 2023-03-28 DIAGNOSIS — E78.5 HYPERLIPIDEMIA LDL GOAL <130: Chronic | ICD-10-CM

## 2023-03-28 DIAGNOSIS — R06.09 DYSPNEA ON EXERTION: Primary | ICD-10-CM

## 2023-03-28 DIAGNOSIS — I10 BENIGN ESSENTIAL HYPERTENSION: Chronic | ICD-10-CM

## 2023-03-28 PROCEDURE — 1160F RVW MEDS BY RX/DR IN RCRD: CPT | Performed by: INTERNAL MEDICINE

## 2023-03-28 PROCEDURE — 1159F MED LIST DOCD IN RCRD: CPT | Performed by: INTERNAL MEDICINE

## 2023-03-28 PROCEDURE — 93000 ELECTROCARDIOGRAM COMPLETE: CPT | Performed by: INTERNAL MEDICINE

## 2023-03-28 PROCEDURE — 3077F SYST BP >= 140 MM HG: CPT | Performed by: INTERNAL MEDICINE

## 2023-03-28 PROCEDURE — 3079F DIAST BP 80-89 MM HG: CPT | Performed by: INTERNAL MEDICINE

## 2023-03-28 PROCEDURE — 99214 OFFICE O/P EST MOD 30 MIN: CPT | Performed by: INTERNAL MEDICINE

## 2023-03-28 RX ORDER — PREGABALIN 75 MG/1
75 CAPSULE ORAL 3 TIMES DAILY
COMMUNITY

## 2023-03-28 RX ORDER — ATORVASTATIN CALCIUM 80 MG/1
80 TABLET, FILM COATED ORAL NIGHTLY
Qty: 90 TABLET | Refills: 3 | Status: SHIPPED | COMMUNITY
Start: 2023-03-28

## 2023-03-28 NOTE — PROGRESS NOTES
Date of Office Visit: 2023  Encounter Provider: Urmila Kline MD  Place of Service: [facility] CARDIOLOGY  Patient Name: Margarette Blackburn  :1950      Patient ID:  Margarette Blackburn is a 72 y.o. female is here for  followup for cardiovascular risks.        History of Present Illness    She has a history of hypertension, hyperlipidemia, history of right breast cancer diagnosed  (see notes) and grade 1 diastolic dysfunction.    Echocardiogram done 2022 showed normal left ventricular ejection fraction with grade 1 diastolic dysfunction, normal RVSP and no significant valvular abnormalities.  This was done at [facility].  She had normal CBC done 2023.  Labs done 2022 showed triglycerides 260, total cholesterol 204, HDL 42, , VLDL 52, normal CMP, low white count at 3.49 but otherwise normal CBC.    She sees Dr. Kassy Mead from medical oncology at [facility].  She had a right total mastectomy with sentinel lymph node dissection of the right axilla on 2017 for inflammatory breast cancer.  She also had neoadjuvant chemotherapy with Trastuzumab, Pertuzumab, and Paclitaxel regimen.   Pertuzumab was discontinued after 1 dose due to severe diarrhea.  She completed her trastuzumab and radiation therapy in 2018.  She has been on anastrozole since that time.    She lives in [place], she is currently not exercising.  She is , has 2 children, is retired from [employer], has never smoked, uses no alcohol.  She does have a family history of hypertension.    She does have exertional dyspnea because she has been somewhat sedentary.  She does not feel her heart racing or skipping.  She has no exertional chest tightness or pressure.  She has never had myocardial infarction or stroke.  She has no orthopnea or PND.  She is taking her medications as directed without difficulty.    Past Medical History:   Diagnosis Date    • Benign essential hypertension 8/21/2019   • GERD (gastroesophageal reflux disease) 10/30/2014   • H/O echocardiogram 8/21/2019    3/7/2018 - Normal LV size and wall thickness.  EF 60%.  Trace MR and TR.   • Hyperlipidemia LDL goal <130 10/18/2012   • Hypomagnesemia 12/14/2018   • Inflammatory carcinoma of breast, stage 3 (HCC) 12/1/2016   • Pericardial effusion 12/14/2018         Past Surgical History:   Procedure Laterality Date   • GALLBLADDER SURGERY     • HYSTERECTOMY     • KNEE SURGERY         Current Outpatient Medications on File Prior to Visit   Medication Sig Dispense Refill   • anastrozole (ARIMIDEX) 1 MG tablet Take 1 tablet by mouth Daily.     • aspirin 81 MG tablet Take 1 tablet by mouth Daily.     • atorvastatin (LIPITOR) 40 MG tablet Take 1 tablet by mouth Every Night.     • BIOTIN PO Take 1 tablet by mouth Daily.     • Cholecalciferol (VITAMIN D3 PO) Take 1 tablet by mouth Daily.     • diclofenac (CATAFLAM) 50 MG tablet Take 1 tablet by mouth Daily.     • lisinopril (PRINIVIL,ZESTRIL) 40 MG tablet Take 1 tablet by mouth Daily.     • loratadine (CLARITIN) 10 MG tablet Take 1 tablet by mouth Daily.     • metoprolol succinate XL (TOPROL-XL) 100 MG 24 hr tablet Take 1.5 tablets by mouth Daily.     • montelukast (SINGULAIR) 10 MG tablet Take 1 tablet by mouth Every Night.     • Omega-3 Fatty Acids (FISH OIL) 1000 MG capsule capsule Take 1 capsule by mouth Daily.     • omeprazole (priLOSEC) 20 MG capsule Take 1 capsule by mouth Daily.     • pregabalin (LYRICA) 75 MG capsule Take 1 capsule by mouth 3 (Three) Times a Day.     • Zinc 50 MG tablet Take 1 tablet by mouth Daily.     • [DISCONTINUED] gabapentin (NEURONTIN) 600 MG tablet Take 600 mg by mouth 3 (Three) Times a Day.       No current facility-administered medications on file prior to visit.       Social History     Socioeconomic History   • Marital status:    Tobacco Use   • Smoking status: Never   Substance and Sexual Activity   • Alcohol  "use: No   • Drug use: No   • Sexual activity: Defer           ROS    Procedures    ECG 12 Lead    Date/Time: 3/28/2023 9:10 AM  Performed by: Urmila Kline MD  Authorized by: Urmila Kline MD   Comparison: compared with previous ECG   Similar to previous ECG  Rhythm: sinus rhythm    Clinical impression: normal ECG                Objective:      Vitals:    03/28/23 0845   BP: 142/88   Pulse: 74   Weight: 84.4 kg (186 lb)   Height: 160 cm (63\")     Body mass index is 32.95 kg/m².    Vitals reviewed.   Constitutional:       General: Not in acute distress.     Appearance: Well-developed. Not diaphoretic.   Eyes:      General: No scleral icterus.     Conjunctiva/sclera: Conjunctivae normal.   HENT:      Head: Normocephalic and atraumatic.   Neck:      Thyroid: No thyromegaly.      Vascular: No carotid bruit or JVD.      Lymphadenopathy: No cervical adenopathy.   Pulmonary:      Effort: Pulmonary effort is normal. No respiratory distress.      Breath sounds: Normal breath sounds. No wheezing. No rhonchi. No rales.   Chest:      Chest wall: Not tender to palpatation.   Cardiovascular:      Normal rate. Regular rhythm.      Murmurs: There is no murmur.      No gallop.   Pulses:     Intact distal pulses.   Edema:     Peripheral edema absent.   Abdominal:      General: Bowel sounds are normal. There is no distension or abdominal bruit.      Palpations: Abdomen is soft. There is no abdominal mass.      Tenderness: There is no abdominal tenderness.   Musculoskeletal:         General: No deformity.      Extremities: No clubbing present.     Cervical back: Neck supple. Skin:     General: Skin is warm and dry. There is no cyanosis.      Coloration: Skin is not pale.      Findings: No rash.   Neurological:      Mental Status: Alert and oriented to person, place, and time.      Cranial Nerves: No cranial nerve deficit.   Psychiatric:         Judgment: Judgment normal.         Lab Review:       Assessment:      " "Diagnosis Plan   1. Benign essential hypertension        2. Hyperlipidemia LDL goal <130        3. Grade I diastolic dysfunction        4. Inflammatory carcinoma of right breast, stage 3 (HCC)          1. Hypertension, goal <120/80.   2. Hyperlipidemia, on atorvastatin 40 mg daily-her LDL still high, increase atorvastatin to 40 mg nightly.  3. History of right breast cancer treated with mastectomy, radiation therapy, paclitaxel/trastuzumab and 1 dose of pertuzumab.  Has been maintained on anastrozole since 2018.  Follows with Dr. Kassy Mead.     Plan:       Anastrozole is known to increase ischemic cardiovascular events, 18-26% increased risk if he remain on this for more than 3 years because of dysregulation of lipid metabolism, increase in atherosclerosis and vasoconstriction.  We will set up stress nuclear perfusion study as she does have exertional dyspnea.    See Netta in 6 months since we are making medication changes.  If she has increasing myalgia with atorvastatin 80, we may need to switch her to rosuvastatin 20 mg daily.  I advised that she consider vascular screening.  I also have asked her to take her blood pressure 1-2 times a week with an automated arm cuff, changing the batteries every 3 months.  She will then send us those readings so able to determine if she needs tighter blood pressure control.  She does take her blood pressure at home and it runs 120s-130s/70s-80s.  I made no blood pressure medication changes at this time.    STOP-Bang Score  Have you been diagnosed with Sleep Apnea?: no  Snoring?: yes  Tired?: yes  Observed?: no  Pressure?: no  Stop Score: 2  Age older than 50 year old?: yes  Neck large? \">17\"/43cm-M, >16\"/41cm-F: no  Gender=Male?: no    "